# Patient Record
Sex: FEMALE | Race: WHITE | NOT HISPANIC OR LATINO | Employment: PART TIME | URBAN - METROPOLITAN AREA
[De-identification: names, ages, dates, MRNs, and addresses within clinical notes are randomized per-mention and may not be internally consistent; named-entity substitution may affect disease eponyms.]

---

## 2017-03-10 ENCOUNTER — LAB VISIT (OUTPATIENT)
Dept: LAB | Facility: OTHER | Age: 27
End: 2017-03-10
Attending: NURSE PRACTITIONER
Payer: OTHER GOVERNMENT

## 2017-03-10 DIAGNOSIS — Z82.79 FAMILY HISTORY OF CHROMOSOMAL ABNORMALITY: Primary | ICD-10-CM

## 2017-03-10 DIAGNOSIS — Z82.79 FAMILY HISTORY OF CHROMOSOMAL ABNORMALITY: ICD-10-CM

## 2017-03-10 PROCEDURE — 36415 COLL VENOUS BLD VENIPUNCTURE: CPT

## 2017-03-10 NOTE — PROGRESS NOTES
Orders placed for FISH testing for 15q11.2-q13.1 Deletion. Blood will be sent to [facility] as parental testing.

## 2017-04-03 LAB
MISCELLANEOUS TEST NAME: NORMAL
REFERENCE LAB: NORMAL
SPECIMEN TYPE: NORMAL
TEST RESULT: NORMAL

## 2017-11-08 DIAGNOSIS — Z82.79 FAMILY HISTORY OF PRADER-WILLI SYNDROME: ICD-10-CM

## 2017-11-08 DIAGNOSIS — Z36.89 ENCOUNTER FOR FETAL ANATOMIC SURVEY: Primary | ICD-10-CM

## 2017-12-06 ENCOUNTER — OFFICE VISIT (OUTPATIENT)
Dept: MATERNAL FETAL MEDICINE | Facility: CLINIC | Age: 27
End: 2017-12-06
Payer: OTHER GOVERNMENT

## 2017-12-06 VITALS
SYSTOLIC BLOOD PRESSURE: 102 MMHG | WEIGHT: 194.25 LBS | HEIGHT: 67 IN | DIASTOLIC BLOOD PRESSURE: 72 MMHG | BODY MASS INDEX: 30.49 KG/M2

## 2017-12-06 DIAGNOSIS — Z82.79 FAMILY HISTORY OF PRADER-WILLI SYNDROME: ICD-10-CM

## 2017-12-06 DIAGNOSIS — O09.292 PREVIOUS CHILD WITH ANOMALY IN SECOND TRIMESTER, ANTEPARTUM: ICD-10-CM

## 2017-12-06 DIAGNOSIS — Z36.89 ENCOUNTER FOR FETAL ANATOMIC SURVEY: ICD-10-CM

## 2017-12-06 PROCEDURE — 99999 PR PBB SHADOW E&M-EST. PATIENT-LVL III: CPT | Mod: PBBFAC,,,

## 2017-12-06 PROCEDURE — 76811 OB US DETAILED SNGL FETUS: CPT | Mod: 26,S$PBB,, | Performed by: OBSTETRICS & GYNECOLOGY

## 2017-12-06 PROCEDURE — 88271 CYTOGENETICS DNA PROBE: CPT

## 2017-12-06 PROCEDURE — 30000890 MISCELLANEOUS SENDOUT TEST

## 2017-12-06 PROCEDURE — 88274 CYTOGENETICS 25-99: CPT

## 2017-12-06 PROCEDURE — 99244 OFF/OP CNSLTJ NEW/EST MOD 40: CPT | Mod: S$PBB,25,, | Performed by: OBSTETRICS & GYNECOLOGY

## 2017-12-06 PROCEDURE — 99213 OFFICE O/P EST LOW 20 MIN: CPT | Mod: PBBFAC

## 2017-12-06 PROCEDURE — 81229 CYTOG ALYS CHRML ABNR SNPCGH: CPT

## 2017-12-06 PROCEDURE — 76811 OB US DETAILED SNGL FETUS: CPT | Mod: PBBFAC | Performed by: OBSTETRICS & GYNECOLOGY

## 2017-12-06 NOTE — PROGRESS NOTES
"OB Ultrasound Report (see PDF version under imaging tab) and consultation    Indication  ========    Consultation: Limited Anatomy, Amniocentesis.    History  ======    General History  Blood group: A  Rhesus: Rh positive  Previous Outcomes  Preg. no. 1  Outcome: Live YOB: 2017  Gest. age 37 w + 0 d  Gender: male  Details: prader willi syndrome, c/s   2  Para 1  Doe children born living (T) 1  Deo children born (T) 1  Doe living children (L) 1  Risk Factors  Details: hx of gestational HTN    Pregnancy History  ===============    Maternal Lab Tests  Test: Cell Free DNA Testing  Result: Informa screen negative  Wants to know gender: yes    Maternal Assessment  ================    Weight 88 kg  Weight (lb) 194 lb  BP syst 102 mmHg  BP diast 72 mmHg    Method  ======    Voluson E10, Transabdominal ultrasound examination. View: Suboptimal view: limited by early gestational age.    Pregnancy  =========    Doe pregnancy. Number of fetuses: 1.    Dating  ======    Cycle: regular cycle  GA by "stated dating" 16 w + 6 d  CHIARA by "stated dating": 2018  Ultrasound examination on: 2017  GA by U/S based upon: AC, BPD, Femur, HC  GA by U/S 17 w + 0 d  CHIARA by U/S: 2018  Assigned: Dating performed on 2017, based on the external assessment  Assigned GA 16 w + 6 d  Assigned CHIARA: 2018    General Evaluation  ===============    Cardiac activity: present.  bpm.  Fetal movements: visualized.  Presentation: Variable lie.  Placenta: posterior.  Umbilical cord: Cord vessels: 3 vessel cord. Cord insertion: placental insertion: normal.  Amniotic fluid: Amount of AF: normal amount.    Fetal Biometry  ===========    Fetal Biometry  BPD 37.1 mm 17w 2d Hadlock  OFD 46.8 mm 17w 5d Gladis  .1 mm 17w 0d Hadlock  .0 mm 16w 6d Hadlock  Femur 23.0 mm 16w 6d Hadlock  Cerebellum tr 17.2 mm 17w 5d Mcintyre  CM 3.4 mm  Nuchal fold 2.56 mm  Humerus 24.6 mm 17w 5d " Gladis   g  Calculated by: Hadlock (BPD-HC-AC-FL)  EFW (lb) 0 lb  EFW (oz) 6 oz  Cephalic index 0.79  HC / AC 1.23  FL / BPD 0.62  FL / AC 0.21   bpm  Head / Face / Neck  Nasal bone 5.8 mm    Fetal Anatomy  ===========    Cranium: normal  Lateral ventricles: normal  Choroid plexus: details  Midline falx: normal  Cavum septi pellucidi: normal  Cerebellum: normal  Cisterna magna: normal  Lt choroid plexus: cyst  Lips: normal  Profile: normal  Nose: normal  4-chamber view: suboptimal  RVOT: suboptimal  LVOT: normal  Situs: normal  Aortic arch: normal  Ductal arch: suboptimal  SVC: suboptimal  IVC: suboptimal  3-vessel view: suboptimal  3-vessel-trachea view: visualized  Rt lung: normal  Lt lung: normal  Diaphragm: normal  Cord insertion: normal  Stomach: normal  Kidneys: normal  Bladder: normal  Abdom. wall: normal  Rt renal artery: normal  Lt renal artery: normal  Cervical spine: normal  Thoracic spine: normal  Lumbar spine: normal  Sacral spine: suboptimal  Arms: both visualized  Legs: both visualized  Gender: female  Wants to know gender: yes    Maternal Structures  ===============    Uterus / Cervix  Cervical length 42.8 mm  Ovaries / Tubes / Adnexa  Rt ovary: Not visualized  Lt ovary: Visualized    Invasive Procedures  ================    Amniocentesis  Start: 10:15 AM  Duration 2 min  Instrument: 22-gauge  Entries uterus: 1  Sample: obtained  Sample amount 19.5 ml  Quality: clear yellow  Post cardiac activity: present, normal  FHR post 150 bpm  Complications: Uncomplicated procedure    Consultation  ==========    Type: Prior child with Prader-Willi Syndrome.  Physician requesting consultation: Dr. Martin LOVE spent 60 minutes on the consultation today with the patient and her spouse Dequan Denton regarding the implications of a prior child  with Prader-Willi Syndrome (PWS) and in review of records. More than 50% of the consultation was spent in face-to-face counseling  and coordination of  care.  Findings and laboratory records from the couple's first child, Joshua Denton, were reviewed and discussed with Sveta Collins, the genetic  counselor who works with Dr. Mendoza, who evaluated the child. 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 Vicente has undergone FISH analysis and was not found to have a deletion in this area. The patient's huband,  Dequan, has not yet undergone FISH analysis for the deletion. Recurrence for PWS is felt to be very low (<1%) per Dr. Mendoza  based on the genetic mechanism responsible for PWS in Joshua.  Both Vicente and Dequan deny a family history of other children with congenital anomalies, genetic disorders, or chromosome  disorders. Joshua is their only child.  In addition to the evaluation for the deletion found to be responsible for PWS in Joshua, we reviewed the range of chromosomal/genetic  abnormalities that can be determined by chromosomal microarray studies on amniocentesis. The possibility of variants of unknown  significance were also reviewed. Additionally, the ability of prenatal ultrasound, as well as its limitations, in the detection of fetal  anomalies were reviewed. Vicente and Dequan indicated that they understood the limitations of the testing options offered and wished to  proceed with amniocentesis and ultrasound evaluation. The risk for pregnancy loss associated with genetic amniocentesis of 1:1000  was reviewed. Genetic amniocentesis was performed today without difficulty, and stable FHM was documented after the procedure.  Because he had not undergone FISH testing for the PWS deletion yet, Dequan indicated that he would like to have his blood drawn  and sent for this testing today. We also offered standard karyotype on Dequan to assess for a possible small chromosomal  arrangement not detectable by microarray/FISH on chromosome 15. Due to a possible out of pocket cost of $500, Dequan declined  cytogenetic  analysis.    Ultrasound Impression  =========    Fetal size is appropriate for established gestational age. No fetal major structural malformations are identified; however, the anatomic  survey is incomplete due to early gestational age. Will plan for a f/u ultrasound exam in 4 weeks to complete the anatomic survey.

## 2017-12-07 NOTE — PROGRESS NOTES
12/6/17- Post amniocentesis instructions handout given to patient and reviewed with her. Patient verbalized understanding and stated she would call MFM or L&D after hours with any questions or concerns.

## 2017-12-11 ENCOUNTER — TELEPHONE (OUTPATIENT)
Dept: MATERNAL FETAL MEDICINE | Facility: CLINIC | Age: 27
End: 2017-12-11

## 2017-12-11 NOTE — TELEPHONE ENCOUNTER
Patient was notified of WNL FISH results and that we would call her as test results came in.  Patient verbalized her understanding.

## 2017-12-12 LAB
COMBIFISH ANALYSIS, AM FLUID: NORMAL
MISCELLANEOUS TEST NAME: NORMAL
REFERENCE LAB: NORMAL
SPECIMEN TYPE: NORMAL
TEST RESULT: NORMAL

## 2017-12-26 ENCOUNTER — TELEPHONE (OUTPATIENT)
Dept: MATERNAL FETAL MEDICINE | Facility: CLINIC | Age: 27
End: 2017-12-26

## 2017-12-26 NOTE — TELEPHONE ENCOUNTER
Patient has been notified of Amniocentesis results:    Normal Female Genomic Profile and Female Karyotype 46, XX.    Pt verbalized understanding of information.

## 2018-01-10 ENCOUNTER — OFFICE VISIT (OUTPATIENT)
Dept: MATERNAL FETAL MEDICINE | Facility: CLINIC | Age: 28
End: 2018-01-10
Payer: OTHER GOVERNMENT

## 2018-01-10 VITALS
BODY MASS INDEX: 31.04 KG/M2 | WEIGHT: 198.19 LBS | DIASTOLIC BLOOD PRESSURE: 74 MMHG | SYSTOLIC BLOOD PRESSURE: 104 MMHG

## 2018-01-10 DIAGNOSIS — Z36.89 ENCOUNTER FOR FETAL ANATOMIC SURVEY: ICD-10-CM

## 2018-01-10 DIAGNOSIS — Z82.79 FAMILY HISTORY OF PRADER-WILLI SYNDROME: ICD-10-CM

## 2018-01-10 PROCEDURE — 99999 PR PBB SHADOW E&M-EST. PATIENT-LVL II: CPT | Mod: PBBFAC,,,

## 2018-01-10 PROCEDURE — 99212 OFFICE O/P EST SF 10 MIN: CPT | Mod: S$PBB,25,, | Performed by: OBSTETRICS & GYNECOLOGY

## 2018-01-10 PROCEDURE — 76811 OB US DETAILED SNGL FETUS: CPT | Mod: 26,S$PBB,, | Performed by: OBSTETRICS & GYNECOLOGY

## 2018-01-10 PROCEDURE — 76811 OB US DETAILED SNGL FETUS: CPT | Mod: PBBFAC | Performed by: OBSTETRICS & GYNECOLOGY

## 2018-01-10 PROCEDURE — 99212 OFFICE O/P EST SF 10 MIN: CPT | Mod: PBBFAC

## 2018-01-10 RX ORDER — MULTIVITAMIN
1 TABLET ORAL DAILY
COMMUNITY

## 2018-01-10 RX ORDER — ASPIRIN 81 MG/1
81 TABLET ORAL DAILY
COMMUNITY

## 2018-01-10 NOTE — PROGRESS NOTES
"OB Ultrasound Report (see PDF version under imaging tab) and f/u office visit    Indication  ========    Fetal anatomy survey - Targeted.    History  ======    General History  Blood group: A  Rhesus: Rh positive  Previous Outcomes  Preg. no. 1  Outcome: Live YOB: 2017  Gest. age 37 w + 0 d  Gender: male  Details: prader willi syndrome, c/s   2  Para 1  Doe children born living (T) 1  Doe children born (T) 1  Doe living children (L) 1  Risk Factors  Details: hx of gestational HTN    Pregnancy History  ===============    Maternal Lab Tests  Test: Cell Free DNA Testing  Result: Informa screen negative  Wants to know gender: yes    Fetal Lab Tests  ============    Result:  Amnio- wnl    Maternal Assessment  ================    Weight 90 kg  Weight (lb) 198 lb  BP syst 104 mmHg  BP diast 74 mmHg    Method  ======    Voluson E10, Transabdominal ultrasound examination. View: Sufficient.    Pregnancy  =========    Doe pregnancy. Number of fetuses: 1.    Dating  ======    Cycle: regular cycle  GA by "stated dating" 21 w + 6 d  CHIARA by "stated dating": 2018  Ultrasound examination on: 1/10/2018  GA by U/S based upon: AC, BPD, Femur, HC  GA by U/S 22 w + 0 d  CHIARA by U/S: 2018  Assigned: Dating performed on 2017, based on the external assessment  Assigned GA 21 w + 6 d  Assigned CHIARA: 2018    General Evaluation  ===============    Cardiac activity: present.  bpm.  Fetal movements: visualized.  Presentation: cephalic.  Placenta: posterior.  Umbilical cord: 3 vessel cord, normal.  Amniotic fluid: normal amount.    Fetal Biometry  ===========    Fetal Biometry  BPD 51.4 mm 21w 4d Hadlock  OFD 71.4 mm 23w 6d Gladis  .4 mm 21w 6d Hadlock  .0 mm 22w 1d Hadlock  Femur 38.6 mm 22w 3d Hadlock  Cerebellum tr 21.9 mm 21w 3d Mcintyre  CM 3.1 mm  Humerus 37.1 mm 22w 6d Gladis   g 48% Richardson  Calculated by: Yves (BPD-HC-AC-FL)  EFW (lb) 1 lb  EFW " (oz) 1 oz  Cephalic index 0.72  HC / AC 1.14  FL / BPD 0.75  FL / AC 0.22   bpm  Head / Face / Neck   4.5 mm    Fetal Anatomy  ===========    Cranium: normal  Lateral ventricles: normal  Choroid plexus: normal  Midline falx: normal  Cavum septi pellucidi: normal  Cerebellum: normal  Cisterna magna: normal  Head shape: normal  Posterior fossa: normal  Neck: normal  Lips: normal  Profile: normal  Nose: normal  4-chamber view: normal  RVOT: normal  LVOT: normal  Heart / Thorax: Septal views: normal  Situs: normal  Aortic arch: normal  Ductal arch: normal  SVC: normal  IVC: normal  3-vessel view: normal  3-vessel-trachea view: normal  Cardiac position: normal  Cardiac axis: normal  Cardiac size: normal  Cardiac rhythm: normal  Rt lung: visualized  Lt lung: visualized  Diaphragm: normal  Cord insertion: normal  Stomach: normal  Kidneys: normal  Bladder: normal  Abdom. wall: normal  Abdom. cavity: normal  Rt kidney: normal  Lt kidney: normal  Cervical spine: normal  Thoracic spine: normal  Lumbar spine: normal  Sacral spine: normal  Arms: both visualized  Legs: both visualized  Rt leg: visualized  Lt leg: visualized  Rt lower leg: normal  Rt foot: normal  Lt lower leg: normal  Lt foot: normal  Position of hands: normal  Position of feet: normal  Gender: female  Wants to know gender: yes    Maternal Structures  ===============    Uterus / Cervix  Uterus: Normal  Cervical length 50.6 mm  Ovaries / Tubes / Adnexa  Rt ovary: Visualized  Lt ovary: Visualized  Other: Uterus and adnexa normal    Impression  =========    A detailed fetal anatomic ultrasound examination was performed today due to the following high risk indication: prior child with Prader  Willi Syndrome. No fetal structural abnormalities are identified today, and fetal size is appropriate for EGA. Cervical length is normal.  Placental location is normal without evidence of previa.  The normal chromosomal microarray results from amniocentesis were  reviewed again today. The patient's spouse has not yet had his  blood drawn for testing to assess for the deletion found in the son affected with Prader-Willi Syndrome. He will arrange to have this  done through his physician at [facility].  No further ultrasound exams are planned here.